# Patient Record
Sex: FEMALE | Race: WHITE | NOT HISPANIC OR LATINO | URBAN - METROPOLITAN AREA
[De-identification: names, ages, dates, MRNs, and addresses within clinical notes are randomized per-mention and may not be internally consistent; named-entity substitution may affect disease eponyms.]

---

## 2017-01-04 ENCOUNTER — APPOINTMENT (RX ONLY)
Dept: URBAN - METROPOLITAN AREA CLINIC 5 | Facility: CLINIC | Age: 62
Setting detail: DERMATOLOGY
End: 2017-01-04

## 2017-01-04 DIAGNOSIS — D69.2 OTHER NONTHROMBOCYTOPENIC PURPURA: ICD-10-CM

## 2017-01-04 DIAGNOSIS — L29.89 OTHER PRURITUS: ICD-10-CM

## 2017-01-04 DIAGNOSIS — T49.0X5 ADVERSE EFFECT OF LOCAL ANTIFUNGAL, ANTI-INFECTIVE AND ANTI-INFLAMMATORY DRUGS: ICD-10-CM

## 2017-01-04 DIAGNOSIS — L81.0 POSTINFLAMMATORY HYPERPIGMENTATION: ICD-10-CM

## 2017-01-04 DIAGNOSIS — L29.8 OTHER PRURITUS: ICD-10-CM

## 2017-01-04 DIAGNOSIS — L20.89 OTHER ATOPIC DERMATITIS: ICD-10-CM | Status: WORSENING

## 2017-01-04 PROBLEM — T49.0X5A ADVERSE EFFECT OF LOCAL ANTIFUNGAL, ANTI-INFECTIVE AND ANTI-INFLAMMATORY DRUGS, INITIAL ENCOUNTER: Status: ACTIVE | Noted: 2017-01-04

## 2017-01-04 PROBLEM — L30.9 DERMATITIS, UNSPECIFIED: Status: ACTIVE | Noted: 2017-01-04

## 2017-01-04 PROCEDURE — 99214 OFFICE O/P EST MOD 30 MIN: CPT | Mod: 24

## 2017-01-04 PROCEDURE — ? RECOMMENDATIONS

## 2017-01-04 PROCEDURE — ? COUNSELING

## 2017-01-04 PROCEDURE — ? PRESCRIPTION

## 2017-01-04 PROCEDURE — ? TREATMENT REGIMEN

## 2017-01-04 RX ORDER — MUPIROCIN 20 MG/G
1 OINTMENT TOPICAL BID
Qty: 3 | Refills: 3 | Status: ERX | COMMUNITY
Start: 2017-01-04

## 2017-01-04 RX ORDER — TRIAMCINOLONE ACETONIDE 1 MG/G
1 CREAM TOPICAL TWICE DAILY
Qty: 1 | Refills: 1 | Status: ERX | COMMUNITY
Start: 2017-01-04

## 2017-01-04 RX ADMIN — TRIAMCINOLONE ACETONIDE 1: 1 CREAM TOPICAL at 16:53

## 2017-01-04 RX ADMIN — MUPIROCIN 1: 20 OINTMENT TOPICAL at 16:53

## 2017-01-04 ASSESSMENT — LOCATION DETAILED DESCRIPTION DERM
LOCATION DETAILED: LEFT ANTERIOR DISTAL THIGH
LOCATION DETAILED: LEFT PROXIMAL PRETIBIAL REGION
LOCATION DETAILED: LEFT PROXIMAL DORSAL FOREARM
LOCATION DETAILED: LEFT DISTAL POSTERIOR THIGH
LOCATION DETAILED: RIGHT DISTAL RADIAL DORSAL FOREARM
LOCATION DETAILED: RIGHT DISTAL LATERAL CALF
LOCATION DETAILED: LEFT DISTAL PRETIBIAL REGION
LOCATION DETAILED: RIGHT ANTERIOR LATERAL PROXIMAL UPPER ARM
LOCATION DETAILED: MIDDLE STERNUM
LOCATION DETAILED: RIGHT DISTAL CALF
LOCATION DETAILED: RIGHT PROXIMAL PRETIBIAL REGION
LOCATION DETAILED: RIGHT DISTAL POSTERIOR THIGH
LOCATION DETAILED: RIGHT INFERIOR UPPER BACK
LOCATION DETAILED: RIGHT MEDIAL UPPER BACK
LOCATION DETAILED: LEFT DISTAL CALF
LOCATION DETAILED: LEFT ANTERIOR PROXIMAL THIGH
LOCATION DETAILED: RIGHT DISTAL POSTERIOR UPPER ARM
LOCATION DETAILED: LEFT LATERAL PROXIMAL PRETIBIAL REGION
LOCATION DETAILED: PERIUMBILICAL SKIN
LOCATION DETAILED: RIGHT ANTERIOR DISTAL THIGH
LOCATION DETAILED: LEFT PROXIMAL CALF
LOCATION DETAILED: LEFT PROXIMAL POSTERIOR UPPER ARM
LOCATION DETAILED: LEFT DISTAL DORSAL FOREARM
LOCATION DETAILED: RIGHT DISTAL PRETIBIAL REGION
LOCATION DETAILED: RIGHT PROXIMAL POSTERIOR UPPER ARM
LOCATION DETAILED: LEFT ANTERIOR LATERAL PROXIMAL UPPER ARM
LOCATION DETAILED: RIGHT PROXIMAL DORSAL FOREARM

## 2017-01-04 ASSESSMENT — LOCATION SIMPLE DESCRIPTION DERM
LOCATION SIMPLE: RIGHT THIGH
LOCATION SIMPLE: RIGHT POSTERIOR THIGH
LOCATION SIMPLE: LEFT POSTERIOR UPPER ARM
LOCATION SIMPLE: LEFT FOREARM
LOCATION SIMPLE: CHEST
LOCATION SIMPLE: RIGHT CALF
LOCATION SIMPLE: RIGHT FOREARM
LOCATION SIMPLE: LEFT CALF
LOCATION SIMPLE: RIGHT POSTERIOR UPPER ARM
LOCATION SIMPLE: RIGHT UPPER BACK
LOCATION SIMPLE: LEFT THIGH
LOCATION SIMPLE: LEFT UPPER ARM
LOCATION SIMPLE: LEFT POSTERIOR THIGH
LOCATION SIMPLE: LEFT PRETIBIAL REGION
LOCATION SIMPLE: RIGHT UPPER ARM
LOCATION SIMPLE: ABDOMEN
LOCATION SIMPLE: RIGHT PRETIBIAL REGION

## 2017-01-04 ASSESSMENT — LOCATION ZONE DERM
LOCATION ZONE: LEG
LOCATION ZONE: TRUNK
LOCATION ZONE: ARM

## 2017-01-04 NOTE — PROCEDURE: TREATMENT REGIMEN
Initiate Treatment: TAC 0.1% cream BID x 2 weeks after using clobetasol for the next 3-4 days\\nMupirocin ointment - apply intranasal bid x 1 week
Discontinue Regimen: clobetasol after 3-4 days\\nElidel application
Otc Regimen: Patient to do wet wraps on arms and legs QHS using either Saran Wrap or leggings. Patient given handout on wet wraps. \\nBleach baths & mupirocin - patient given handout on home treatment for staph skin infections
Plan: Regimen written out for patient. Patient declined oral antibiotics. Will continue with clobetasol bid for the next 3-4 days, then switch to TAC cream
Detail Level: Zone
Continue Regimen: Hydroxyzine 25mg - take 2-3 pills QHS\\nAllegra QAM, Zyrtec QHS
Modify Regimen: Hydroxyzine 25mg - may increase from 1 QHS to 2-3 QHS prn

## 2017-01-04 NOTE — PROCEDURE: RECOMMENDATIONS
Recommendations (Free Text): Recommended eval and patch testing with Dr. CHRISTIAN Garcia.  Pt states she had patch testing, but she does not have records of what was tested. Asked patient take a copy of their pathology report, patch testing results and dermatology records to Dr. Garcia and to us. Patient states she has to  allergy testing records and will try to bring at f/u\\nRecommend patient review her previous medications for any\\n changes in color, brand etc. patient takes numerous supplements.  Patient already discontinued nonessential meds including supplements \\n\\nPatient reports previously seeing an allergist but no allergies were found.
Detail Level: Zone
Recommendation Preamble: The following recommendations were made during the visit:

## 2017-01-05 ENCOUNTER — APPOINTMENT (RX ONLY)
Dept: URBAN - METROPOLITAN AREA CLINIC 5 | Facility: CLINIC | Age: 62
Setting detail: DERMATOLOGY
End: 2017-01-05

## 2017-01-05 DIAGNOSIS — L29.8 OTHER PRURITUS: ICD-10-CM

## 2017-01-05 DIAGNOSIS — L29.89 OTHER PRURITUS: ICD-10-CM

## 2017-01-05 DIAGNOSIS — L81.0 POSTINFLAMMATORY HYPERPIGMENTATION: ICD-10-CM

## 2017-01-05 DIAGNOSIS — L20.89 OTHER ATOPIC DERMATITIS: ICD-10-CM

## 2017-01-05 PROBLEM — L30.9 DERMATITIS, UNSPECIFIED: Status: ACTIVE | Noted: 2017-01-05

## 2017-01-05 PROCEDURE — 11101: CPT | Mod: 79

## 2017-01-05 PROCEDURE — ? COUNSELING

## 2017-01-05 PROCEDURE — ? BIOPSY BY PUNCH METHOD

## 2017-01-05 PROCEDURE — ? TREATMENT REGIMEN

## 2017-01-05 PROCEDURE — ? PRESCRIPTION

## 2017-01-05 PROCEDURE — 99213 OFFICE O/P EST LOW 20 MIN: CPT | Mod: 24,25

## 2017-01-05 PROCEDURE — 11100: CPT | Mod: 79

## 2017-01-05 PROCEDURE — ? RECOMMENDATIONS

## 2017-01-05 RX ORDER — TRIAMCINOLONE ACETONIDE 1 MG/G
OINTMENT TOPICAL TWICE DAILY
Qty: 1 | Refills: 3 | Status: ERX | COMMUNITY
Start: 2017-01-05

## 2017-01-05 RX ORDER — PREDNISONE 10 MG/1
TABLET ORAL AS DIRECTED
Qty: 30 | Refills: 0 | Status: ERX | COMMUNITY
Start: 2017-01-05

## 2017-01-05 RX ORDER — CLOBETASOL PROPIONATE 0.5 MG/G
OINTMENT TOPICAL BID PRN
Qty: 1 | Refills: 6 | Status: CANCELLED
Stop reason: CLARIF

## 2017-01-05 RX ADMIN — TRIAMCINOLONE ACETONIDE: 1 OINTMENT TOPICAL at 17:48

## 2017-01-05 RX ADMIN — PREDNISONE: 10 TABLET ORAL at 17:48

## 2017-01-05 ASSESSMENT — LOCATION DETAILED DESCRIPTION DERM
LOCATION DETAILED: LEFT PROXIMAL PRETIBIAL REGION
LOCATION DETAILED: LEFT DISTAL PRETIBIAL REGION
LOCATION DETAILED: LEFT PROXIMAL CALF
LOCATION DETAILED: LEFT ANTERIOR DISTAL THIGH
LOCATION DETAILED: RIGHT DISTAL POSTERIOR THIGH
LOCATION DETAILED: RIGHT DISTAL LATERAL CALF
LOCATION DETAILED: RIGHT INFERIOR UPPER BACK
LOCATION DETAILED: LEFT PROXIMAL DORSAL FOREARM
LOCATION DETAILED: RIGHT DISTAL POSTERIOR UPPER ARM
LOCATION DETAILED: LEFT LATERAL PROXIMAL PRETIBIAL REGION
LOCATION DETAILED: RIGHT DISTAL RADIAL DORSAL FOREARM
LOCATION DETAILED: RIGHT DISTAL PRETIBIAL REGION
LOCATION DETAILED: PERIUMBILICAL SKIN
LOCATION DETAILED: RIGHT PROXIMAL PRETIBIAL REGION
LOCATION DETAILED: LEFT PROXIMAL POSTERIOR UPPER ARM
LOCATION DETAILED: LEFT DISTAL DORSAL FOREARM
LOCATION DETAILED: LEFT DISTAL CALF
LOCATION DETAILED: LEFT DISTAL POSTERIOR THIGH
LOCATION DETAILED: LEFT ANTERIOR LATERAL PROXIMAL UPPER ARM
LOCATION DETAILED: RIGHT PROXIMAL DORSAL FOREARM
LOCATION DETAILED: RIGHT ANTERIOR LATERAL PROXIMAL UPPER ARM
LOCATION DETAILED: RIGHT ANTERIOR DISTAL THIGH
LOCATION DETAILED: RIGHT PROXIMAL POSTERIOR UPPER ARM
LOCATION DETAILED: RIGHT DISTAL CALF
LOCATION DETAILED: RIGHT MEDIAL UPPER BACK
LOCATION DETAILED: MIDDLE STERNUM
LOCATION DETAILED: LEFT ANTERIOR PROXIMAL THIGH

## 2017-01-05 ASSESSMENT — LOCATION SIMPLE DESCRIPTION DERM
LOCATION SIMPLE: RIGHT UPPER ARM
LOCATION SIMPLE: LEFT THIGH
LOCATION SIMPLE: RIGHT PRETIBIAL REGION
LOCATION SIMPLE: RIGHT FOREARM
LOCATION SIMPLE: LEFT CALF
LOCATION SIMPLE: LEFT FOREARM
LOCATION SIMPLE: RIGHT UPPER BACK
LOCATION SIMPLE: RIGHT POSTERIOR UPPER ARM
LOCATION SIMPLE: RIGHT POSTERIOR THIGH
LOCATION SIMPLE: LEFT PRETIBIAL REGION
LOCATION SIMPLE: LEFT POSTERIOR UPPER ARM
LOCATION SIMPLE: CHEST
LOCATION SIMPLE: LEFT UPPER ARM
LOCATION SIMPLE: LEFT POSTERIOR THIGH
LOCATION SIMPLE: ABDOMEN
LOCATION SIMPLE: RIGHT CALF
LOCATION SIMPLE: RIGHT THIGH

## 2017-01-05 ASSESSMENT — LOCATION ZONE DERM
LOCATION ZONE: ARM
LOCATION ZONE: LEG
LOCATION ZONE: TRUNK

## 2017-01-05 NOTE — PROCEDURE: TREATMENT REGIMEN
Detail Level: Zone
Modify Regimen: Hydroxyzine 25mg - may increase from 1 QHS to 2-3 QHS prn
Discontinue Regimen: clobetasol after 3-4 days\\nElidel application
Initiate Treatment: TAC 0.1% cream BID x 2 weeks after using clobetasol for the next 3-4 days\\nMupirocin ointment - apply intranasal bid x 1 week
Otc Regimen: Patient to do wet wraps on arms and legs QHS using either Saran Wrap or leggings. Patient given handout on wet wraps. \\nBleach baths & mupirocin - patient given handout on home treatment for staph skin infections
Continue Regimen: Hydroxyzine 25mg - take 2-3 pills QHS\\nAllegra QAM, Zyrtec QHS
Plan: Regimen written out for patient. Patient declined oral antibiotics. Will continue with clobetasol bid for the next 3-4 days, then switch to TAC cream

## 2017-01-05 NOTE — PROCEDURE: BIOPSY BY PUNCH METHOD
Billing Type: Third-Party Bill
Epidermal Sutures: 4-0 Ethilon
Suture Removal: 14 days
Anesthesia Volume In Cc: 0.5
Consent: Written consent was obtained and risks were reviewed including but not limited to scarring, infection, bleeding, scabbing, incomplete removal, nerve damage and allergy to anesthesia.
Dressing: bandage
Patient Will Remove Sutures At Home?: No
Anesthesia Type: 1% lidocaine with epinephrine
X Size Of Lesion In Cm (Optional): 0
Detail Level: Detailed
Wound Care: Bacitracin
Lab: [id number]
Lab Facility: [id number]
Home Suture Removal Text: Patient was provided a home suture removal kit and will remove their sutures at home.  If they have any questions or difficulties they will call the office.
Body Location Override (Optional - Billing Will Still Be Based On Selected Body Map Location If Applicable): Right superior dorsal forearm
Render Post-Care Instructions In Note?: yes
Biopsy Type: H and E
Hemostasis: None
Notification Instructions: Patient will be notified of biopsy results. However, patient instructed to call the office if not contacted within 2 weeks.
Punch Size In Mm: 2
Additional Anesthesia Volume In Cc (Will Not Render If 0): 6
Post-Care Instructions: I reviewed with the patient in detail post-care instructions. Patient is to keep the biopsy site dry overnight, and then apply bacitracin twice daily until healed. Patient may apply hydrogen peroxide soaks to remove any crusting.
Body Location Override (Optional - Billing Will Still Be Based On Selected Body Map Location If Applicable): Right inferior dorsal forearm
Biopsy Type: DIF

## 2017-01-19 ENCOUNTER — APPOINTMENT (RX ONLY)
Dept: URBAN - METROPOLITAN AREA CLINIC 5 | Facility: CLINIC | Age: 62
Setting detail: DERMATOLOGY
End: 2017-01-19

## 2017-01-19 DIAGNOSIS — L20.89 OTHER ATOPIC DERMATITIS: ICD-10-CM | Status: WELL CONTROLLED

## 2017-01-19 DIAGNOSIS — Z48.02 ENCOUNTER FOR REMOVAL OF SUTURES: ICD-10-CM

## 2017-01-19 PROBLEM — L30.9 DERMATITIS, UNSPECIFIED: Status: ACTIVE | Noted: 2017-01-19

## 2017-01-19 PROCEDURE — ? SUTURE REMOVAL (GLOBAL PERIOD)

## 2017-01-19 PROCEDURE — ? COUNSELING

## 2017-01-19 PROCEDURE — 99213 OFFICE O/P EST LOW 20 MIN: CPT

## 2017-01-19 PROCEDURE — ? PRESCRIPTION

## 2017-01-19 PROCEDURE — ? TREATMENT REGIMEN

## 2017-01-19 RX ORDER — PREDNISONE 10 MG/1
AS DIRECTED TABLET ORAL AS DIRECTED
Qty: 30 | Refills: 0

## 2017-01-19 ASSESSMENT — LOCATION DETAILED DESCRIPTION DERM
LOCATION DETAILED: LEFT PROXIMAL DORSAL FOREARM
LOCATION DETAILED: RIGHT PROXIMAL RADIAL DORSAL FOREARM
LOCATION DETAILED: RIGHT PROXIMAL DORSAL FOREARM

## 2017-01-19 ASSESSMENT — LOCATION SIMPLE DESCRIPTION DERM
LOCATION SIMPLE: RIGHT FOREARM
LOCATION SIMPLE: LEFT FOREARM

## 2017-01-19 ASSESSMENT — LOCATION ZONE DERM: LOCATION ZONE: ARM

## 2017-01-19 NOTE — PROCEDURE: SUTURE REMOVAL (GLOBAL PERIOD)
Detail Level: Detailed
Add 12742 Cpt? (Important Note: In 2017 The Use Of 07118 Is Being Tracked By Cms To Determine Future Global Period Reimbursement For Global Periods): no

## 2017-01-19 NOTE — PROCEDURE: TREATMENT REGIMEN
Detail Level: Zone
Otc Regimen: Fragrance free detergent, body care
Plan: T/C methotrexate,cellcept
Continue Regimen: TAC Cream/Ointment BID for two weeks maximum per month

## 2017-03-01 ENCOUNTER — APPOINTMENT (RX ONLY)
Dept: URBAN - METROPOLITAN AREA CLINIC 5 | Facility: CLINIC | Age: 62
Setting detail: DERMATOLOGY
End: 2017-03-01

## 2017-03-01 DIAGNOSIS — L20.89 OTHER ATOPIC DERMATITIS: ICD-10-CM | Status: WORSENING

## 2017-03-01 DIAGNOSIS — L29.8 OTHER PRURITUS: ICD-10-CM

## 2017-03-01 DIAGNOSIS — L29.89 OTHER PRURITUS: ICD-10-CM

## 2017-03-01 PROBLEM — L30.9 DERMATITIS, UNSPECIFIED: Status: ACTIVE | Noted: 2017-03-01

## 2017-03-01 PROCEDURE — ? TREATMENT REGIMEN

## 2017-03-01 PROCEDURE — 99213 OFFICE O/P EST LOW 20 MIN: CPT

## 2017-03-01 PROCEDURE — ? PRESCRIPTION

## 2017-03-01 PROCEDURE — ? COUNSELING

## 2017-03-01 RX ORDER — LEVOCETIRIZINE DIHYDROCHLORIDE 5 MG/1
1 TABLET, FILM COATED ORAL QD
Qty: 30 | Refills: 5 | Status: ERX | COMMUNITY
Start: 2017-03-01

## 2017-03-01 RX ORDER — PREDNISONE 10 MG/1
1 TABLET ORAL QD
Qty: 45 | Refills: 0 | Status: ERX

## 2017-03-01 RX ADMIN — LEVOCETIRIZINE DIHYDROCHLORIDE 1: 5 TABLET, FILM COATED ORAL at 15:39

## 2017-03-01 ASSESSMENT — LOCATION DETAILED DESCRIPTION DERM
LOCATION DETAILED: LEFT VENTRAL PROXIMAL FOREARM
LOCATION DETAILED: LEFT DISTAL DORSAL FOREARM
LOCATION DETAILED: LEFT PROXIMAL DORSAL FOREARM
LOCATION DETAILED: RIGHT PROXIMAL DORSAL FOREARM
LOCATION DETAILED: RIGHT VENTRAL PROXIMAL FOREARM

## 2017-03-01 ASSESSMENT — LOCATION SIMPLE DESCRIPTION DERM
LOCATION SIMPLE: LEFT FOREARM
LOCATION SIMPLE: RIGHT FOREARM

## 2017-03-01 ASSESSMENT — LOCATION ZONE DERM: LOCATION ZONE: ARM

## 2017-03-01 NOTE — PROCEDURE: TREATMENT REGIMEN
Plan: Recommended [email or web address]. Recommended to stop all nonessential medications like vitamins for 8 weeks
Detail Level: Zone
Continue Regimen: Benadryl or hydroxyzine as needed at night \\nTac 0.1% ointment twice daily as needed, Clobetasol ointment twice daily as needed
Initiate Treatment: Take Xyzal in the morning\\Mauri 4 weeks, start prednisone taper. 10mg pills, 5 x 3 days, 4 x 3 days, 3 x 3 days, 2 x 3 days, 1 x 3 days

## 2017-05-17 ENCOUNTER — APPOINTMENT (RX ONLY)
Dept: URBAN - METROPOLITAN AREA CLINIC 5 | Facility: CLINIC | Age: 62
Setting detail: DERMATOLOGY
End: 2017-05-17

## 2017-05-17 DIAGNOSIS — L40.0 PSORIASIS VULGARIS: ICD-10-CM

## 2017-05-17 DIAGNOSIS — Z79.899 OTHER LONG TERM (CURRENT) DRUG THERAPY: ICD-10-CM

## 2017-05-17 PROBLEM — L30.9 DERMATITIS, UNSPECIFIED: Status: ACTIVE | Noted: 2017-05-17

## 2017-05-17 PROCEDURE — ? HIGH RISK MEDICATION MONITORING

## 2017-05-17 PROCEDURE — 11101: CPT

## 2017-05-17 PROCEDURE — ? BIOPSY BY SHAVE METHOD

## 2017-05-17 PROCEDURE — ? COUNSELING

## 2017-05-17 PROCEDURE — 11100: CPT

## 2017-05-17 PROCEDURE — ? OTHER

## 2017-05-17 ASSESSMENT — LOCATION ZONE DERM
LOCATION ZONE: HAND
LOCATION ZONE: LEG
LOCATION ZONE: FEET

## 2017-05-17 ASSESSMENT — LOCATION DETAILED DESCRIPTION DERM
LOCATION DETAILED: LEFT RADIAL PALM
LOCATION DETAILED: LEFT PROXIMAL PRETIBIAL REGION
LOCATION DETAILED: LEFT LATERAL DORSAL FOOT
LOCATION DETAILED: RIGHT PROXIMAL PRETIBIAL REGION
LOCATION DETAILED: LEFT ULNAR PALM

## 2017-05-17 ASSESSMENT — LOCATION SIMPLE DESCRIPTION DERM
LOCATION SIMPLE: RIGHT PRETIBIAL REGION
LOCATION SIMPLE: LEFT HAND
LOCATION SIMPLE: LEFT FOOT
LOCATION SIMPLE: LEFT PRETIBIAL REGION

## 2017-05-17 NOTE — PROCEDURE: OTHER
Detail Level: Zone
Other (Free Text): Sample otezla pack given : lot:[id number]\\nExp: 11/2017 \\nRec xyzal,sarna lotion , and soriatane
Note Text (......Xxx Chief Complaint.): This diagnosis correlates with the

## 2017-05-17 NOTE — PROCEDURE: BIOPSY BY SHAVE METHOD
X Size Of Lesion In Cm: 0
Bill For Surgical Tray: no
Notification Instructions: Patient will be notified of biopsy results. However, patient instructed to call the office if not contacted within 2 weeks. Results will be faxed to PCP once they are available.
Hemostasis: Drysol
Type Of Destruction Used: Curettage
Lab Facility: [id number]
Render Post-Care Instructions In Note?: yes
Electrodesiccation Text: The wound bed was treated with electrodesiccation after the biopsy was performed.
Biopsy Type: H and E
Body Location Override (Optional - Billing Will Still Be Based On Selected Body Map Location If Applicable): Left hand
Anesthesia Type: 1% lidocaine with epinephrine
Biopsy Method: Dermablade
Electrodesiccation And Curettage Text: The wound bed was treated with electrodesiccation and curettage after the biopsy was performed.
Curettage Text: The wound bed was treated with curettage after the biopsy was performed.
Dressing: bandage
Post-Care Instructions: I reviewed with the patient in detail post-care instructions. Patient is to keep the biopsy site dry overnight, and then apply bacitracin twice daily until healed. Patient may apply hydrogen peroxide soaks to remove any crusting.
Consent: Written consent was obtained and risks were reviewed including but not limited to scarring, infection, bleeding, scabbing, incomplete removal, nerve damage and allergy to anesthesia.
Anesthesia Volume In Cc: 0.5
Cryotherapy Text: The wound bed was treated with cryotherapy after the biopsy was performed.
Detail Level: Detailed
Billing Type: Third-Party Bill
Silver Nitrate Text: The wound bed was treated with silver nitrate after the biopsy was performed.
Wound Care: Petrolatum
Body Location Override (Optional - Billing Will Still Be Based On Selected Body Map Location If Applicable): Left lateral dorsal foot

## 2024-01-04 NOTE — PROCEDURE: COUNSELING
Detail Level: Zone
baseline COVID and flu are negative.  Venous blood gas does show mildly elevated CO2 but again patient has no obstructive findings no wheezing on exam.  She does continue to smoke.  However she is saturating 100%.  Patient's laboratory workup shows mild compensatory metabolic alkalosis so likely chronic elevated CO2 patient's creatinine and GFR at her baseline CBC is within normal limits.  Awaiting CT PE at this time [SC]   0500 CT PE is negative.  No signs of fluid overload.  No focal consolidation pulmonary edema.  Patient continues to saturate well.  Likely has mild viral symptoms.  Will discharge with instructions for strict turn precautions for any new or worsening symptoms as well as counseled the patient on smoking cessation [SC]      ED Course User Index  [SC] David Burgess MD       Patient was given the following medications:  Medications   acetaminophen (TYLENOL) tablet 1,000 mg (1,000 mg Oral Given 1/4/24 0302)   ondansetron (ZOFRAN-ODT) disintegrating tablet 4 mg (4 mg Oral Given 1/4/24 0302)   iopamidol (ISOVUE-370) 76 % injection 75 mL (75 mLs IntraVENous Given 1/4/24 0356)       Disposition Considerations (tests considered but not done, Shared Decision Making, Pt Expectation of Test or Tx.): I estimate there is LOW risk for PULMONARY EMBOLISM, ACUTE CORONARY SYNDROME, OR THORACIC AORTIC DISSECTION, thus I consider the discharge disposition reasonable.     I completed a HEART Score to screen for Major Adverse Cardiac Event (MACE) in this patient. The evidence indicates that  the patient is very low risk for MACE and this is consistent with my clinical intuition.  The risk of further workup or hospitalization for MACE is likely higher than the risk of the patient having a MACE. It is,  therefore, in the patient’s best interest not to do additional emergent testing or to be hospitalized for MACE.  I have discussed with the patient my clinical impression and the result of the HEART Score to screen